# Patient Record
(demographics unavailable — no encounter records)

---

## 2024-12-19 NOTE — CURRENT MEDS
General [Takes medication as prescribed] : takes [None] : Patient does not have any barriers to medication adherence [Yes] : Reviewed medication list for presence of high-risk medications. [Blood Thinners] : blood thinners [FreeTextEntry1] : Eliquis -

## 2024-12-19 NOTE — ASSESSMENT
[FreeTextEntry1] : 59-year-old male presented to ED for increasing back pain for 1 week. Reports having shortness of breath for last 2 days, LOVING and he decided to come to ED for further evaluation. He complains about chest pain for 2 days, non-radiating, 5/10. Reports to have feeling of dizziness. Noted to have hemoptysis in ED. Denies vomiting, palpitation, abdominal pain. ED course: Acetaminophen 1 gm IV x1, started Heparin drip; [facility] team contacted and at this time no acute intervention required, can reassess if clinical status changes. CTA Chest: Extensive bilateral pulmonary thromboembolism with right heart strain. Pulmonary infarcts in both lower lobes. Sub centimeter mediastinal lymphadenopathy. Multiple hepatic cysts. You are medically stable for discharge home. Given new acute pulmonary embolism continues with high dose Eliquis 10mg (2 tablets) twice a day for 7 days then take 5mg (1 tablet) twice a day ongoing. [facility]

## 2024-12-19 NOTE — CURRENT MEDS
[Takes medication as prescribed] : takes [None] : Patient does not have any barriers to medication adherence [Yes] : Reviewed medication list for presence of high-risk medications. [Blood Thinners] : blood thinners [FreeTextEntry1] : Eliquis -

## 2024-12-19 NOTE — HISTORY OF PRESENT ILLNESS
[Home] : at home, [unfilled] , at the time of the visit. [Other Location: e.g. Home (Enter Location, City,State)___] : at [unfilled] [Verbal consent obtained from patient] : the patient, [unfilled] [FreeTextEntry1] : F/u post hospitalization at [facility] 12/16 [de-identified] : This patient is Enrolled in the Post-Discharge [facility] Care Services Follow Up Program through [facility] for Continuity of Care S/P Recent Hospitalization until seen by PCP.  Brief Hospital Course copied: 59-year-old male presented to ED for increasing back pain for 1 week. Reports having shortness of breath for last 2 days, LOVING and he decided to come to ED for further evaluation. He complains about chest pain for 2 days, non-radiating, 5/10. Reports to have feeling of dizziness. Noted to have hemoptysis in ED. Denies vomiting, palpitation, abdominal pain. ED course: Acetaminophen 1 gm IV x1, started Heparin drip; [facility] team contacted and at this time no acute intervention required, can reassess if clinical status changes. CTA Chest: Extensive bilateral pulmonary thromboembolism with right heart strain. Pulmonary infarcts in both lower lobes. Sub centimeter mediastinal lymphadenopathy. Multiple hepatic cysts. You are medically stable for discharge home. Given new acute pulmonary embolism continues with high dose Eliquis 10mg (2 tablets) twice a day for 7 days then take 5mg (1 tablet) twice a day ongoing. Establish care with [facility].  Televisit made with pt and his wife. Pt is awake and alert and oriented x3. States he is feeling much better. He denies CP, SOB,  TCM NP reviewed that pt is under the [facility] program for home care until pt is seen by PCP. TCM NP will be assigned to sign Home Care orders post-discharge

## 2024-12-19 NOTE — ASSESSMENT
[FreeTextEntry1] : 59-year-old male presented to ED for increasing back pain for 1 week. Reports having shortness of breath for last 2 days, LOVING and he decided to come to ED for further evaluation. He complains about chest pain for 2 days, non-radiating, 5/10. Reports to have feeling of dizziness. Noted to have hemoptysis in ED. Denies vomiting, palpitation, abdominal pain. ED course: Acetaminophen 1 gm IV x1, started Heparin drip; [facility] team contacted and at this time no acute intervention required, can reassess if clinical status changes. CTA Chest: Extensive bilateral pulmonary thromboembolism with right heart strain. Pulmonary infarcts in both lower lobes. Sub centimeter mediastinal lymphadenopathy. Multiple hepatic cysts. You are medically stable for discharge home. Given new acute pulmonary embolism continues with high dose Eliquis 10mg (2 tablets) twice a day for 7 days then take 5mg (1 tablet) twice a day ongoing. [facility]

## 2024-12-19 NOTE — HISTORY OF PRESENT ILLNESS
[Home] : at home, [unfilled] , at the time of the visit. [Other Location: e.g. Home (Enter Location, City,State)___] : at [unfilled] [Verbal consent obtained from patient] : the patient, [unfilled] [FreeTextEntry1] : [place] [facility] [place] [de-identified] : This patient is Enrolled in the Post-Discharge [facility] Care Services Follow Up Program through [facility] for Continuity of Care S/P Recent Hospitalization until seen by PCP.  Brief Hospital Course copied: 59-year-old male presented to ED for increasing back pain for 1 week. Reports having shortness of breath for last 2 days, LOVING and he decided to come to ED for further evaluation. He complains about chest pain for 2 days, non-radiating, 5/10. Reports to have feeling of dizziness. Noted to have hemoptysis in ED. Denies vomiting, palpitation, abdominal pain. ED course: Acetaminophen 1 gm IV x1, started Heparin drip; [facility] team contacted and at this time no acute intervention required, can reassess if clinical status changes. CTA Chest: Extensive bilateral pulmonary thromboembolism with right heart strain. Pulmonary infarcts in both lower lobes. Sub centimeter mediastinal lymphadenopathy. Multiple hepatic cysts. You are medically stable for discharge home. Given new acute pulmonary embolism continues with high dose Eliquis 10mg (2 tablets) twice a day for 7 days then take 5mg (1 tablet) twice a day ongoing. Establish care with [facility].  Televisit made with pt and his wife. Pt is awake and alert and oriented x3. States he is feeling much better. He denies CP, SOB,  TCM NP reviewed that pt is under the [facility] program for home care until pt is seen by PCP. TCM NP will be assigned to sign Home Care orders post-discharge

## 2024-12-19 NOTE — PLAN
[FreeTextEntry1] : 1. cont all medications as prescribed 2. keep f/u with [facility], [place] [facility] 12/19 3. keep f/u with [facility] 4. keep f/u with PCP 1/5 2025

## 2024-12-19 NOTE — REVIEW OF SYSTEMS
[Negative] : Heme/Lymph [Fever] : no fever [Vision Problems] : no vision problems [Hearing Loss] : no hearing loss [Chest Pain] : no chest pain [Lower Ext Edema] : no lower extremity edema [Shortness Of Breath] : no shortness of breath [Nausea] : no nausea [Constipation] : no constipation [Diarrhea] : no diarrhea [Vomiting] : no vomiting [Dysuria] : no dysuria [Incontinence] : no incontinence [Hesitancy] : no hesitancy [Hematuria] : no hematuria [Joint Pain] : no joint pain [Joint Stiffness] : no joint stiffness [Muscle Pain] : no muscle pain [Muscle Weakness] : no muscle weakness [Back Pain] : no back pain [Itching] : no itching [Skin Rash] : no skin rash [Unsteady Walk] : no ataxia [Suicidal] : not suicidal [Insomnia] : no insomnia [Anxiety] : no anxiety [Depression] : no depression

## 2025-01-06 NOTE — DISCUSSION/SUMMARY
[FreeTextEntry1] : Status post bilateral PE with pulmonary infarcts.  CT showed RV strain.  This was not evident by BNP or echocardiogram.  Echo should be repeated.  Patient should also have ETT to see improvement in his cardiovascular status.  Hx of TIA: occurred in 2021. On Eliquis due to above.  He is not taking statins.  Target LDL less than 70.  He is trying to reduce cholesterol with diet.  I do not have recent lipid profile available for review.  Again, recommend adding atorvastatin 20mg daily. Goal LDL less than 70.  .Carotid artery disease: moderate non, obstructive plaque seen on carotid duplex. Recommend smoking cessation and atorvastatin 20mg daily.  Continue Eliquis  Follow up after ETT.  He will call results for echocardiogram.  Thank you for this referral and allowing me to participate in the care of this patient.  If I can be of any further help or  if you have any questions, please do not hesitate to contact me  Sincerely,  Froilan Altamirano MD, FACC, [place]

## 2025-01-06 NOTE — REVIEW OF SYSTEMS
[Negative] : Musculoskeletal [FreeTextEntry5] : see HPI [FreeTextEntry6] : see HPI [de-identified] : see HPI

## 2025-01-06 NOTE — CARDIOLOGY SUMMARY
[de-identified] : 3/10/2023, NSR, possible JUAN A [de-identified] : 6/8/2023, LV EF 60-65%, normal LV diastolic function, trace MR, trace TR. [de-identified] : 3/20/2023, luminal irregularities [de-identified] : 6/8/2023, moderate non-obstructive disease.

## 2025-01-06 NOTE — PHYSICAL EXAM
[Normal] : moves all extremities, no focal deficits, normal speech [de-identified] : No carotid bruits auscultated bilaterally

## 2025-01-06 NOTE — HISTORY OF PRESENT ILLNESS
[FreeTextEntry1] : 58 year old male, active smoker, PMHx of TIA in 2021, syncope in December 2022.  He had worsening dyspnea for 4 days and left lower extremity swelling and chest discomfort on 12/15/2024.  He was found to have bilateral pulmonary embolism with pulmonary infarcts in both lower lobes.  Patient was started on anticoagulation and has felt much improved although not back to baseline.  His CAT scan showed RV strain.  However, echocardiogram showed normal RV size and function, estimated RA pressures were normal and pulmonary pressures were normal.  BNP was low.  There is no history of MI, CVA, CHF, or previous coronary intervention.

## 2025-01-06 NOTE — COUNSELING
[Cessation strategies including cessation program discussed] : Cessation strategies including cessation program discussed [Use of nicotine replacement therapies and other medications discussed] : Use of nicotine replacement therapies and other medications discussed [Encouraged to pick a quit date and identify support needed to quit] : Encouraged to pick a quit date and identify support needed to quit [Smoking Cessation Program Referral] : Smoking Cessation Program Referral  [Yes] : Willing to quit smoking [FreeTextEntry1] : 4

## 2025-05-05 NOTE — HISTORY OF PRESENT ILLNESS
[FreeTextEntry1] : Franky is a 59yoM with PMHx: HLD, Carotid dz, pre-DM, Pulmonary Embolism (12/2024), TIA (2021), Syncope (2022), and PVCs.  SoHx: Current smoker.  Pt presents in clinic today for interval follow up and review of testing. ETT was done today 5/5/25 Pt exercised 10mins on Kalia protocol, frequent ventricular ectopy/v. couplets with exercise. Negative for ischemia. Peak BP was 156/78. Echo was done 2/10/25 with LVEF 55-60%, Aortic Root 3.8cm (was 3.2cm in 12/2024), PASP 31mmHg.  These results were reviewed with him in detail.  Unfortunately he continues to smoke cigarettes, 1/2 PPD.  He feels well, at his normal level of activity, working, very active on weekends with cardio/heavy lifting.   There is no exertional chest pain, pressure or discomfort. There is no significant dyspnea on exertion or shortness of breath. There is no LE edema, PND or orthopnea. There are no symptomatic palpitations, lightheadedness, dizziness or syncope.  BP is well controlled at 116/72. Weight is stable. He has lost 4lbs since 1/2025.   Labs in 2/2025 show . He is not on statins. A1C is 6.1%.   Dx with Pulmonary Embolism, bilateral PEs with pulmonary infarcts in both lower lobes, RV strain, in 12/2024, on Eliquis, CT PE Protocol was repeated on 4/7/25 which shows resolved pulmonary embolism. Incidental finding of indeterminate 0.8cm nodular opacity LLL and unchanged since 2021.

## 2025-05-05 NOTE — DISCUSSION/SUMMARY
[FreeTextEntry1] : WALE MAC is a 59 year old M who presents today May 05, 2025 with the above history and the following active issues:   Ventricular Ectopy: V. couplets and PVCs noted during ETT today. Recommend 3 day Holter monitor to determine presence of arrhythmias or significant pauses. He will come into [address] [place] [address] Office Thursday 5/8/25 for placement.   HLD: .  Current Smoker. Prior TIA.  Moderate Carotid dz on US in 2023.  Not on statins. Pt refuses statins at this time. Recommend CT Calcium score to identify any plaque present within coronaries. If Calcium score >0 he will reconsider statin therapy.  Dietary changes to reduce saturated fat intake and other measures to reduce cholesterol have been discussed.   Dilated Aortic Root: Increased from 3.2cm to 3.8cm 12/2024 to 2/2025.  Recommend Abdominal US for aortic size.  Smoking cessation! reviewed at length.   Prior TIA: 2021. No neurologic events since this episode. No focal/neuro deficits.   pre-DM: A1C 6.1%. Dietary changes reviewed with him as above including reduction of carbs/sugars.   No further syncope since 2022.   Pulmonary Embolism resolved on CT on 4/7/25. Remains on Eliquis 5mg BID. Ongoing f/u with Pulm.   F/u after Abd US, Holter and CT Calcium score, or sooner PRN.  Limitations of non-invasive testing reviewed. Red flag symptoms which would warrant sooner emergent evaluation reviewed with the patient. All questions and concerns were addressed and answered.   Sincerely,  Nani Dow, [facility] Patient's history, testing, medications and any relative changes in plan of care reviewed with supervising MD: Dr. Froilan Altamirano

## 2025-05-05 NOTE — CARDIOLOGY SUMMARY
[de-identified] : 3/10/2023, NSR, possible JUAN A   [de-identified] : 12/16/2024 LVEF 55-60%, normal diastolic function, PASP 25mmHg. Mild MR. Mild TR.  6/8/2023, LV EF 60-65%, normal LV diastolic function, trace MR, trace TR.   [de-identified] : 3/20/2023: luminal irregularities   [de-identified] : 6/8/2023 Carotid US: moderate non-obstructive disease.

## 2025-05-05 NOTE — HISTORY OF PRESENT ILLNESS
[FreeTextEntry1] : Franky is a 59yoM with PMHx:   SoHx: Current smoker.   Pt exercised 10mins on Kalia protocol, frequent ventricular ectopy/v. couplets with exercise. Negative for ischemia. Peak BP was 156/78.

## 2025-05-05 NOTE — COUNSELING
[Yes] : Risk of tobacco use and health benefits of smoking cessation discussed: Yes [Cessation strategies including cessation program discussed] : Cessation strategies including cessation program discussed [Use of nicotine replacement therapies and other medications discussed] : Use of nicotine replacement therapies and other medications discussed [Encouraged to pick a quit date and identify support needed to quit] : Encouraged to pick a quit date and identify support needed to quit [No] : Not willing to quit smoking numerical 0-10 [FreeTextEntry1] : 5

## 2025-06-18 NOTE — HISTORY OF PRESENT ILLNESS
[FreeTextEntry1] : Franky is a 59yoM with PMHx: HLD, Carotid dz, pre-DM, Pulmonary Embolism (12/2024), TIA (2021), Syncope (2022), and PVCs. SoHx: Current smoker. Of note: Dx with Pulmonary Embolism, bilateral PEs with pulmonary infarcts in both lower lobes, RV strain, in 12/2024, on Eliquis, CT PE Protocol was repeated on 4/7/25 which shows resolved pulmonary embolism. Incidental finding of indeterminate 0.8cm nodular opacity LLL and unchanged since 2021.  He presents in clinic today to review results of testing.  He underwent the following: Echo 2/10/25 with LVEF 55-60%, Aortic Root 3.8cm (was 3.2cm in 12/2024), PASP 31mmHg. ETT 5/5/25 Pt exercised 10mins on Kalia protocol, frequent ventricular ectopy/v. couplets with exercise. Negative for ischemia. Peak BP was 156/78. Holter 5/8-5/14/25 Sinus Rhythm, Avg HR 85bpm, <1% ectopy.  Abd US 5/22/25 No evidence of abdominal aortic aneurysm. CT Calcium Score 5/22/25 Calcium score 0. Calcification in the LAD and RCA does not meet Agatston coronary artery calcium score threshold. These results were reviewed with him in detail.  Lab work was done 1/2025 with Cr 0.93, K 5, normal LFTs, **, HDL 34, Trigs 78, A1C 6.1%, normal thyroid function.  As compared to lab work on file from 2023 *, HDL 45, Trigs 71, Cr 1.19, K 4.9, normal LFTs.   Since last seen he reports significant diet changes, reduced red meat intake to once weekly, unfortunately continues to smoke 1/2 PPD cigarettes.  He has remained at his normal level of activity, very active, cardio/heavy lifting.  There is no exertional chest pain, pressure or discomfort. There is no significant dyspnea on exertion or shortness of breath. There is no LE edema, PND or orthopnea. There are no symptomatic palpitations, lightheadedness, dizziness or syncope.  BP is well controlled at 124/76. He has lost 3lbs since 5/2025.

## 2025-06-18 NOTE — CARDIOLOGY SUMMARY
[de-identified] : 3/10/2023, NSR, possible JUAN A   [de-identified] : 12/16/2024 LVEF 55-60%, normal diastolic function, PASP 25mmHg. Mild MR. Mild TR.  6/8/2023, LV EF 60-65%, normal LV diastolic function, trace MR, trace TR.   [de-identified] : 3/20/2023: luminal irregularities   [de-identified] : 6/8/2023 Carotid US: moderate non-obstructive disease.

## 2025-06-18 NOTE — DISCUSSION/SUMMARY
[FreeTextEntry1] : WALE MAC is a 60 year old M who presents today Jun 18, 2025 with the above history and the following active issues:  Ventricular Ectopy: Holter monitor showed <1% ectopy. Asymptomatic.   HLD: . Improved from 2023 where LDL was 151. Current Smoker. Prior TIA.  CT Calcium score 5/2025 with calcium score 0.  ASVCD risk 16.2%. Pt refuses statin therapy. Reviewed at length with him. Continues to decline.   Carotid Dz: Moderate Carotid dz on US in 2023. Prior TIA. Refuses statins.   Prior TIA: 2021. No neurologic events since this episode. No focal/neuro deficits.   Dietary changes to reduce saturated fat intake and other measures to reduce cholesterol have been discussed.   Dilated Aortic Root: Increased from 3.2cm to 3.8cm 12/2024 to 2/2025. Abd US 5/2025 showed no evidence of AAA. Smoking cessation! reviewed at length.   pre-DM: A1C 6.1%. Dietary changes reviewed with him as above including reduction of carbs/sugars.   No further syncope since 2022.   Pulmonary Embolism resolved on CT on 4/7/25. Remains on Eliquis 5mg BID. Ongoing f/u with Pulm.   Annual follow up or sooner PRN.  Recommend updating fasting lab work. He would like to wait a few months to continue diet changes before retesting. Lab slip will be mailed to him. He will call for results.  Limitations of non-invasive testing reviewed. Red flag symptoms which would warrant sooner emergent evaluation reviewed with the patient. All questions and concerns were addressed and answered.   Sincerely,  Nani Dow, [facility] Patient's history, testing, medications and any relative changes in plan of care reviewed with supervising MD: Dr. Froilan Altamirano